# Patient Record
Sex: FEMALE | Race: WHITE | NOT HISPANIC OR LATINO
[De-identification: names, ages, dates, MRNs, and addresses within clinical notes are randomized per-mention and may not be internally consistent; named-entity substitution may affect disease eponyms.]

---

## 2020-05-21 PROBLEM — Z00.00 ENCOUNTER FOR PREVENTIVE HEALTH EXAMINATION: Status: ACTIVE | Noted: 2020-05-21

## 2020-05-26 ENCOUNTER — APPOINTMENT (OUTPATIENT)
Dept: ORTHOPEDIC SURGERY | Facility: CLINIC | Age: 85
End: 2020-05-26
Payer: MEDICARE

## 2020-05-26 VITALS — WEIGHT: 120 LBS | RESPIRATION RATE: 16 BRPM | HEIGHT: 59 IN | BODY MASS INDEX: 24.19 KG/M2

## 2020-05-26 DIAGNOSIS — S69.91XA UNSPECIFIED INJURY OF RIGHT WRIST, HAND AND FINGER(S), INITIAL ENCOUNTER: ICD-10-CM

## 2020-05-26 DIAGNOSIS — S63.639D SPRAIN OF INTERPHALANGEAL JOINT OF UNSPECIFIED FINGER, SUBSEQUENT ENCOUNTER: ICD-10-CM

## 2020-05-26 DIAGNOSIS — Z87.898 PERSONAL HISTORY OF OTHER SPECIFIED CONDITIONS: ICD-10-CM

## 2020-05-26 DIAGNOSIS — Z82.61 FAMILY HISTORY OF ARTHRITIS: ICD-10-CM

## 2020-05-26 DIAGNOSIS — Z86.39 PERSONAL HISTORY OF OTHER ENDOCRINE, NUTRITIONAL AND METABOLIC DISEASE: ICD-10-CM

## 2020-05-26 DIAGNOSIS — Z87.39 PERSONAL HISTORY OF OTHER DISEASES OF THE MUSCULOSKELETAL SYSTEM AND CONNECTIVE TISSUE: ICD-10-CM

## 2020-05-26 DIAGNOSIS — Z87.891 PERSONAL HISTORY OF NICOTINE DEPENDENCE: ICD-10-CM

## 2020-05-26 DIAGNOSIS — M18.0 BILATERAL PRIMARY OSTEOARTHRITIS OF FIRST CARPOMETACARPAL JOINTS: ICD-10-CM

## 2020-05-26 PROCEDURE — 99203 OFFICE O/P NEW LOW 30 MIN: CPT

## 2020-07-18 ENCOUNTER — EMERGENCY (EMERGENCY)
Facility: HOSPITAL | Age: 85
LOS: 1 days | Discharge: HOME | End: 2020-07-18
Attending: EMERGENCY MEDICINE | Admitting: EMERGENCY MEDICINE
Payer: MEDICARE

## 2020-07-18 VITALS
HEIGHT: 58 IN | HEART RATE: 82 BPM | OXYGEN SATURATION: 97 % | SYSTOLIC BLOOD PRESSURE: 152 MMHG | WEIGHT: 119.93 LBS | TEMPERATURE: 98 F | DIASTOLIC BLOOD PRESSURE: 94 MMHG | RESPIRATION RATE: 18 BRPM

## 2020-07-18 PROCEDURE — L9991: CPT

## 2020-07-18 NOTE — ED ADULT TRIAGE NOTE - CHIEF COMPLAINT QUOTE
"I started to have problem with my left eye yesterday at 3pm and it seemed like there was hair in front of my eye but there was nothing here". Pt states this happened once before and there was nothing wrong with her eye then.

## 2020-07-18 NOTE — ED PROVIDER NOTE - OBJECTIVE STATEMENT
85 y/o f no pmh presents c/o floater in left eye which started yesterday.  Pt reports after bending over to pick something up, she thought she had hair in her face and after trying to brush her hair aside she realized there was no hair, something was in her visual field.  Pt stating her symptoms have gradually subsided although still a floater in left eye and intermittent black spots.  Pt reports no change in vision.  Pt attempted to contact her ophthalmologist who is in [place], an associate could see her Monday but she didn't want to wait.  Denies headache, dizziness, all other ROS negative.

## 2020-07-18 NOTE — ED ADULT NURSE NOTE - OBJECTIVE STATEMENT
Pt. c/o L eye floater since 1530 yesterday, was planning to go to [facility] which is closed today. Denies any eye pain, swelling, diplopia, photophobia, other Sx. Hx of b/l cataract surgery 4-5 years ago. Visual acuity b/l 20/30.

## 2020-07-18 NOTE — ED PROVIDER NOTE - CLINICAL SUMMARY MEDICAL DECISION MAKING FREE TEXT BOX
83 y/o f no pmh presents for evaluation of floater in left eye since yesterday; pt gave history and was informed I am not an ophthalmologist, there is one on call to consult.  Pt became upset, wants to see an ophthalmologist now, doesn't want the ED provider to call one.  Pt walked out of ED prior to any physical being performed, wouldn't wait for AMA papers.

## 2020-07-22 DIAGNOSIS — H57.89 OTHER SPECIFIED DISORDERS OF EYE AND ADNEXA: ICD-10-CM

## 2021-04-23 ENCOUNTER — RESULT REVIEW (OUTPATIENT)
Age: 86
End: 2021-04-23